# Patient Record
Sex: FEMALE | Race: WHITE | NOT HISPANIC OR LATINO | Employment: OTHER | RURAL
[De-identification: names, ages, dates, MRNs, and addresses within clinical notes are randomized per-mention and may not be internally consistent; named-entity substitution may affect disease eponyms.]

---

## 2022-05-05 ENCOUNTER — HOSPITAL ENCOUNTER (OUTPATIENT)
Dept: RADIOLOGY | Facility: HOSPITAL | Age: 77
Discharge: HOME | End: 2022-05-05
Attending: ORTHOPAEDIC SURGERY
Payer: MEDICARE

## 2022-05-05 DIAGNOSIS — M79.672 PAIN OF LEFT HEEL: ICD-10-CM

## 2022-05-05 PROCEDURE — 73650 X-RAY EXAM OF HEEL: CPT | Mod: TC,LT

## 2022-06-08 ENCOUNTER — OFFICE VISIT (OUTPATIENT)
Dept: DERMATOLOGY | Facility: CLINIC | Age: 77
End: 2022-06-08
Payer: MEDICARE

## 2022-06-08 VITALS — RESPIRATION RATE: 18 BRPM

## 2022-06-08 DIAGNOSIS — L43.9 LICHEN PLANUS: Primary | ICD-10-CM

## 2022-06-08 PROCEDURE — 99203 OFFICE O/P NEW LOW 30 MIN: CPT | Mod: ,,, | Performed by: DERMATOLOGY

## 2022-06-08 PROCEDURE — 99203 PR OFFICE/OUTPT VISIT, NEW, LEVL III, 30-44 MIN: ICD-10-PCS | Mod: ,,, | Performed by: DERMATOLOGY

## 2022-06-08 RX ORDER — CLOBETASOL PROPIONATE 0.5 MG/G
CREAM TOPICAL
Qty: 60 G | Refills: 1 | Status: SHIPPED | OUTPATIENT
Start: 2022-06-08 | End: 2022-07-21 | Stop reason: SDUPTHER

## 2022-06-08 NOTE — PROGRESS NOTES
[facility]   Nereyda Quick MD    Patient Name: Shawn Pickard  Patient YOB: 1945   Date of Service: 6/8/22    CC: Rash    HPI: Shawn Pickard is a 76 y.o. female here today for rash, located on the bilateral arms and legs.  Rash has been present for 2 months.  Previous treatments include MTX,  hydrocortisone cream and benadryl.     History reviewed. No pertinent past medical history.  History reviewed. No pertinent surgical history.  Review of patient's allergies indicates:   Allergen Reactions    Penicillins      Other reaction(s): Rash      Sulfa (sulfonamide antibiotics)      Other reaction(s): KIDNEY PROBLEMS         Current Outpatient Medications:     clobetasoL (TEMOVATE) 0.05 % cream, Apply to rash on body BID, tapering with improvement, Disp: 60 g, Rfl: 1    hydroCHLOROthiazide (HYDRODIURIL) 25 MG tablet, , Disp: , Rfl:     hydrOXYzine HCL (ATARAX) 25 MG tablet, hydroxyzine HCl 25 mg tablet  Take 1 tablet twice a day by oral route as needed for 30 days., Disp: , Rfl:     inFLIXimab (REMICADE) 100 mg injection, Remicade 100 mg intravenous solution  Inject every 2 months by intravenous route., Disp: , Rfl:     levothyroxine (SYNTHROID) 50 MCG tablet, , Disp: , Rfl:     metoprolol succinate (TOPROL-XL) 25 MG 24 hr tablet, metoprolol succinate ER 25 mg tablet,extended release 24 hr, Disp: , Rfl:     mirtazapine (REMERON) 7.5 MG Tab, , Disp: , Rfl:     oxybutynin (DITROPAN-XL) 5 MG TR24, , Disp: , Rfl:     potassium chloride SA (K-DUR,KLOR-CON) 20 MEQ tablet, , Disp: , Rfl:     traMADoL (ULTRAM) 50 mg tablet, , Disp: , Rfl:     ROS: A focused review of systems was obtained and negative.     Exam: A focused skin exam was performed. All areas examined were normal except as mentioned in the assessment and plan below.  General Appearance of the patient is well developed and well nourished.  Orientation: alert and oriented x 3.  Mood and affect: pleasant.    Assessment:   The  encounter diagnosis was Lichen planus.    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  Expectations: Lichen Planus can last for months and is pruritic, but usually responsive to therapy. Lichen planus can be associated with chronic liver disease, viral hepatitis, and anti-malarials, gold and penicillamine.  Contact office if: Lichen Planus worsens, or spread to other parts of the body.    - will start clobetasol but consider systemic if Inadequately controlled with topical steroids alone    Follow up in about 6 weeks (around 7/20/2022) for Lichen Planus .    Nereyda Quick MD

## 2022-06-17 ENCOUNTER — HOSPITAL ENCOUNTER (OUTPATIENT)
Dept: RADIOLOGY | Facility: HOSPITAL | Age: 77
Discharge: HOME | End: 2022-06-17
Attending: ORTHOPAEDIC SURGERY
Payer: MEDICARE

## 2022-06-17 DIAGNOSIS — S92.011A CLOSED DISPLACED FRACTURE OF BODY OF RIGHT CALCANEUS, INITIAL ENCOUNTER: ICD-10-CM

## 2022-06-17 PROCEDURE — 73650 X-RAY EXAM OF HEEL: CPT | Mod: TC,RT

## 2022-07-21 ENCOUNTER — OFFICE VISIT (OUTPATIENT)
Dept: DERMATOLOGY | Facility: CLINIC | Age: 77
End: 2022-07-21
Payer: MEDICARE

## 2022-07-21 DIAGNOSIS — L43.9 LICHEN PLANUS: Primary | ICD-10-CM

## 2022-07-21 DIAGNOSIS — L29.9 PRURITUS: ICD-10-CM

## 2022-07-21 PROCEDURE — 99214 OFFICE O/P EST MOD 30 MIN: CPT | Mod: ,,, | Performed by: DERMATOLOGY

## 2022-07-21 PROCEDURE — 99214 PR OFFICE/OUTPT VISIT, EST, LEVL IV, 30-39 MIN: ICD-10-PCS | Mod: ,,, | Performed by: DERMATOLOGY

## 2022-07-21 RX ORDER — HYDROXYZINE HYDROCHLORIDE 25 MG/1
25 TABLET, FILM COATED ORAL EVERY 6 HOURS PRN
Qty: 180 TABLET | Refills: 1 | Status: SHIPPED | OUTPATIENT
Start: 2022-07-21 | End: 2022-09-19

## 2022-07-21 RX ORDER — CLOBETASOL PROPIONATE 0.5 MG/G
CREAM TOPICAL
Qty: 60 G | Refills: 1 | Status: SHIPPED | OUTPATIENT
Start: 2022-07-21

## 2022-07-21 NOTE — PROGRESS NOTES
[facility]   Nereyda Quick MD    Patient Name: Shawn Pickard  Patient YOB: 1945   Date of Service: 7/21/22    CC: Follow-up Lichen planus    HPI: Shawn Pickard is a 76 y.o. female here today for follow-up of lichen planus, last seen 06/22.  Previous treatments include clobetasol cream.  Overall, the lichen planus is improved.  Treatment plan was followed as directed.    History reviewed. No pertinent past medical history.  History reviewed. No pertinent surgical history.  Review of patient's allergies indicates:   Allergen Reactions    Penicillins      Other reaction(s): Rash      Sulfa (sulfonamide antibiotics)      Other reaction(s): KIDNEY PROBLEMS         Current Outpatient Medications:     clobetasoL (TEMOVATE) 0.05 % cream, Apply to rash on body BID, tapering with improvement, Disp: 60 g, Rfl: 1    hydroCHLOROthiazide (HYDRODIURIL) 25 MG tablet, , Disp: , Rfl:     hydrOXYzine HCL (ATARAX) 25 MG tablet, hydroxyzine HCl 25 mg tablet  Take 1 tablet twice a day by oral route as needed for 30 days., Disp: , Rfl:     hydrOXYzine HCL (ATARAX) 25 MG tablet, Take 1 tablet (25 mg total) by mouth every 6 (six) hours as needed for Itching., Disp: 180 tablet, Rfl: 1    inFLIXimab (REMICADE) 100 mg injection, Remicade 100 mg intravenous solution  Inject every 2 months by intravenous route., Disp: , Rfl:     levothyroxine (SYNTHROID) 50 MCG tablet, , Disp: , Rfl:     metoprolol succinate (TOPROL-XL) 25 MG 24 hr tablet, metoprolol succinate ER 25 mg tablet,extended release 24 hr, Disp: , Rfl:     mirtazapine (REMERON) 7.5 MG Tab, , Disp: , Rfl:     oxybutynin (DITROPAN-XL) 5 MG TR24, , Disp: , Rfl:     potassium chloride SA (K-DUR,KLOR-CON) 20 MEQ tablet, , Disp: , Rfl:     traMADoL (ULTRAM) 50 mg tablet, , Disp: , Rfl:     ROS: A focused review of systems was obtained and negative.     Exam: A focused skin exam was performed. All areas examined were normal except as mentioned in the  assessment and plan below.  General Appearance of the patient is well developed and well nourished.  Orientation: alert and oriented x 3.  Mood and affect: pleasant.    Assessment:   The primary encounter diagnosis was Lichen planus. A diagnosis of Pruritus was also pertinent to this visit.    Plan:   Medications Ordered This Encounter   Medications    clobetasoL (TEMOVATE) 0.05 % cream     Sig: Apply to rash on body BID, tapering with improvement     Dispense:  60 g     Refill:  1    hydrOXYzine HCL (ATARAX) 25 MG tablet     Sig: Take 1 tablet (25 mg total) by mouth every 6 (six) hours as needed for Itching.     Dispense:  180 tablet     Refill:  1        Lichen Planus with significant pruritus   - purple polygonal lichenoid papules  Status: Inadequately controlled      Plan: Counseling  I counseled the patient regarding the following:  Skin care: Recommend topical steroids, ambient sun exposure and antihistamines if itchy.  Expectations: Lichen Planus can last for months and is pruritic, but usually responsive to therapy. Lichen planus can be associated with chronic liver disease, viral hepatitis, and anti-malarials, gold and penicillamine.  Contact office if: Lichen Planus worsens, or spread to other parts of the body.    - continue clobetasol BID scheduled  - discussed her care with her NP Zeinab Childers.  She is ok with adding hydroxyzine in place of benadryl q6 hours PRN itching        Follow up in about 6 weeks (around 9/1/2022) for Lichen Planus.    Nereyda Quick MD

## 2022-08-23 ENCOUNTER — HOSPITAL ENCOUNTER (OUTPATIENT)
Dept: RADIOLOGY | Facility: HOSPITAL | Age: 77
Discharge: HOME | End: 2022-08-23
Attending: ORTHOPAEDIC SURGERY
Payer: MEDICARE

## 2022-08-23 DIAGNOSIS — M25.562 ACUTE PAIN OF LEFT KNEE: ICD-10-CM

## 2022-08-23 PROCEDURE — 73564 X-RAY EXAM KNEE 4 OR MORE: CPT | Mod: TC,LT

## 2022-09-01 ENCOUNTER — OFFICE VISIT (OUTPATIENT)
Dept: DERMATOLOGY | Facility: CLINIC | Age: 77
End: 2022-09-01
Payer: MEDICARE

## 2022-09-01 VITALS — RESPIRATION RATE: 18 BRPM | BODY MASS INDEX: 20.24 KG/M2 | HEIGHT: 62 IN | WEIGHT: 110 LBS

## 2022-09-01 DIAGNOSIS — L43.9 LICHEN PLANUS: Primary | ICD-10-CM

## 2022-09-01 PROCEDURE — 99213 OFFICE O/P EST LOW 20 MIN: CPT | Mod: ,,, | Performed by: DERMATOLOGY

## 2022-09-01 PROCEDURE — 99213 PR OFFICE/OUTPT VISIT, EST, LEVL III, 20-29 MIN: ICD-10-PCS | Mod: ,,, | Performed by: DERMATOLOGY

## 2022-09-01 RX ORDER — NYSTATIN 100000 U/G
CREAM TOPICAL
COMMUNITY
Start: 2022-08-05

## 2022-09-01 RX ORDER — FOLIC ACID 1 MG/1
1 TABLET ORAL
COMMUNITY

## 2022-09-01 RX ORDER — MUPIROCIN 20 MG/G
OINTMENT TOPICAL
COMMUNITY

## 2022-09-01 RX ORDER — ERGOCALCIFEROL 1.25 MG/1
CAPSULE ORAL
COMMUNITY

## 2022-09-01 RX ORDER — CHLORHEXIDINE GLUCONATE ORAL RINSE 1.2 MG/ML
SOLUTION DENTAL
COMMUNITY

## 2022-09-01 RX ORDER — METRONIDAZOLE 250 MG/1
250 TABLET ORAL 3 TIMES DAILY
COMMUNITY
Start: 2022-07-20

## 2022-09-01 RX ORDER — SERTRALINE HYDROCHLORIDE 25 MG/1
25 TABLET, FILM COATED ORAL DAILY
COMMUNITY
Start: 2022-08-29

## 2022-09-01 RX ORDER — CIPROFLOXACIN 500 MG/1
500 TABLET ORAL 2 TIMES DAILY
COMMUNITY
Start: 2022-07-20

## 2022-09-01 RX ORDER — GUAIFENESIN 1200 MG
TABLET, EXTENDED RELEASE 12 HR ORAL DAILY PRN
COMMUNITY

## 2022-09-01 RX ORDER — ONDANSETRON 4 MG/1
TABLET, FILM COATED ORAL
COMMUNITY
Start: 2022-08-02

## 2022-09-01 RX ORDER — DEXAMETHASONE SODIUM PHOSPHATE 4 MG/ML
INJECTION, SOLUTION INTRA-ARTICULAR; INTRALESIONAL; INTRAMUSCULAR; INTRAVENOUS; SOFT TISSUE
COMMUNITY
Start: 2022-05-11

## 2022-09-01 RX ORDER — KETOCONAZOLE 20 MG/ML
SHAMPOO, SUSPENSION TOPICAL
COMMUNITY
Start: 2021-09-04

## 2022-09-01 RX ORDER — QUETIAPINE FUMARATE 25 MG/1
25 TABLET, FILM COATED ORAL NIGHTLY
COMMUNITY
Start: 2022-08-27

## 2022-09-01 RX ORDER — CHOLECALCIFEROL (VITAMIN D3) 125 MCG
CAPSULE ORAL
COMMUNITY

## 2022-09-01 RX ORDER — CHOLESTYRAMINE 4 G/4.8G
POWDER, FOR SUSPENSION ORAL
COMMUNITY

## 2022-09-01 RX ORDER — HYDROCODONE BITARTRATE AND ACETAMINOPHEN 5; 325 MG/1; MG/1
TABLET ORAL
COMMUNITY
Start: 2022-08-22

## 2022-09-01 RX ORDER — DICLOFENAC SODIUM 10 MG/G
GEL TOPICAL
COMMUNITY

## 2022-09-01 RX ORDER — PREDNISONE 10 MG/1
10 TABLET ORAL DAILY
Qty: 21 TABLET | Refills: 0 | Status: SHIPPED | OUTPATIENT
Start: 2022-09-01 | End: 2022-09-22

## 2022-09-01 RX ORDER — FLUCONAZOLE 100 MG/1
TABLET ORAL
COMMUNITY
Start: 2022-08-05

## 2022-09-01 RX ORDER — TRIAMCINOLONE ACETONIDE 1 MG/G
OINTMENT TOPICAL
COMMUNITY

## 2022-09-01 RX ORDER — CLINDAMYCIN HYDROCHLORIDE 300 MG/1
CAPSULE ORAL
COMMUNITY

## 2022-09-01 NOTE — PROGRESS NOTES
[facility]   Nereyda Quick MD    Patient Name: Shawn Pickard  Patient YOB: 1945   Date of Service: 9/1/22    CC: Follow-up Lichen Planus     HPI: Shawn Pickard is a 76 y.o. female here today for follow-up of lichen planus, last seen 7/21/22.  Previous treatments include clobetasol and hydroxyzine.  Overall, the lichen planus is stable.  Treatment plan was followed as directed.    Past Medical History:   Diagnosis Date    Crohn disease     Hypertension     Osteoarthritis      Past Surgical History:   Procedure Laterality Date    JOINT REPLACEMENT      LAPAROSCOPIC LEFT COLON RESECTION      TONSILLECTOMY       Review of patient's allergies indicates:   Allergen Reactions    Penicillins      Other reaction(s): Rash      Sulfa (sulfonamide antibiotics)      Other reaction(s): KIDNEY PROBLEMS         Current Outpatient Medications:     ketoconazole (NIZORAL) 2 % shampoo, ketoconazole 2 % shampoo, Disp: , Rfl:     acetaminophen 325 mg Cap, Take by mouth daily as needed., Disp: , Rfl:     chlorhexidine (PERIDEX) 0.12 % solution, chlorhexidine gluconate 0.12 % mouthwash, Disp: , Rfl:     cholestyramine-aspartame (QUESTRAN LIGHT) 4 gram PwPk, Cholestyramine Light 4 gram oral powder  Take 1 scoop 3 times a day by oral route., Disp: , Rfl:     ciprofloxacin HCl (CIPRO) 500 MG tablet, Take 500 mg by mouth 2 (two) times daily., Disp: , Rfl:     clindamycin (CLEOCIN) 300 MG capsule, clindamycin HCl 300 mg capsule, Disp: , Rfl:     clobetasoL (TEMOVATE) 0.05 % cream, Apply to rash on body BID, tapering with improvement, Disp: 60 g, Rfl: 1    dexamethasone (DECADRON) 4 mg/mL injection, Inject into the muscle., Disp: , Rfl:     diclofenac sodium (VOLTAREN) 1 % Gel, diclofenac 1 % topical gel, Disp: , Rfl:     ergocalciferol (ERGOCALCIFEROL) 50,000 unit Cap, Vitamin D2 1,250 mcg (50,000 unit) capsule  Take 1 capsule every week by oral route., Disp: , Rfl:     fluconazole (DIFLUCAN) 100 MG tablet, , Disp: ,  Rfl:     folic acid (FOLVITE) 1 MG tablet, Take 1 mg by mouth., Disp: , Rfl:     hydroCHLOROthiazide (HYDRODIURIL) 25 MG tablet, , Disp: , Rfl:     HYDROcodone-acetaminophen (NORCO) 5-325 mg per tablet, , Disp: , Rfl:     hydrOXYzine HCL (ATARAX) 25 MG tablet, hydroxyzine HCl 25 mg tablet  Take 1 tablet twice a day by oral route as needed for 30 days., Disp: , Rfl:     hydrOXYzine HCL (ATARAX) 25 MG tablet, Take 1 tablet (25 mg total) by mouth every 6 (six) hours as needed for Itching., Disp: 180 tablet, Rfl: 1    inFLIXimab (REMICADE) 100 mg injection, Remicade 100 mg intravenous solution  Inject every 2 months by intravenous route., Disp: , Rfl:     Lactobac 66-Bifido 4-S.thermo (ADVANCED PROBIOTIC-14) 3 billion cell Cap, Probiotic, Disp: , Rfl:     levothyroxine (SYNTHROID) 50 MCG tablet, , Disp: , Rfl:     metoprolol succinate (TOPROL-XL) 25 MG 24 hr tablet, metoprolol succinate ER 25 mg tablet,extended release 24 hr, Disp: , Rfl:     metroNIDAZOLE (FLAGYL) 250 MG tablet, Take 250 mg by mouth 3 (three) times daily., Disp: , Rfl:     mirtazapine (REMERON) 7.5 MG Tab, , Disp: , Rfl:     mupirocin (BACTROBAN) 2 % ointment, mupirocin 2 % topical ointment, Disp: , Rfl:     nystatin (MYCOSTATIN) cream, , Disp: , Rfl:     ondansetron (ZOFRAN) 4 MG tablet, , Disp: , Rfl:     oxybutynin (DITROPAN-XL) 5 MG TR24, , Disp: , Rfl:     potassium chloride SA (K-DUR,KLOR-CON) 20 MEQ tablet, , Disp: , Rfl:     predniSONE (DELTASONE) 10 MG tablet, Take 1 tablet (10 mg total) by mouth once daily. for 21 days, Disp: 21 tablet, Rfl: 0    QUEtiapine (SEROQUEL) 25 MG Tab, Take 25 mg by mouth every evening., Disp: , Rfl:     sertraline (ZOLOFT) 25 MG tablet, Take 25 mg by mouth once daily., Disp: , Rfl:     traMADoL (ULTRAM) 50 mg tablet, , Disp: , Rfl:     triamcinolone acetonide 0.1% (KENALOG) 0.1 % ointment, triamcinolone acetonide 0.1 % topical ointment, Disp: , Rfl:     ustekinumab (STELARA) 90 mg/mL Syrg syringe, 1 mL., Disp: ,  Rfl:     ROS: A focused review of systems was obtained and negative.     Exam: A focused skin exam was performed. All areas examined were normal except as mentioned in the assessment and plan below.  General Appearance of the patient is well developed and well nourished.  Orientation: alert and oriented x 3.  Mood and affect: pleasant.    Assessment:   The encounter diagnosis was Lichen planus.    Plan:   Medications Ordered This Encounter   Medications    predniSONE (DELTASONE) 10 MG tablet     Sig: Take 1 tablet (10 mg total) by mouth once daily. for 21 days     Dispense:  21 tablet     Refill:  0     Lichen Planus  - purple polygonal lichenoid papules  Status: Inadequately controlled    Plan: Counseling  I counseled the patient regarding the following:  Skin care: Recommend topical steroids, ambient sun exposure and antihistamines if itchy.  Expectations: Lichen Planus can last for months and is pruritic, but usually responsive to therapy. Lichen planus can be associated with chronic liver disease, viral hepatitis, and anti-malarials, gold and penicillamine.  Contact office if: Lichen Planus worsens, or spread to other parts of the body.    - Will start low dose prednisone x3 weeks   - continue clobetasol     Prednisone Counseling: I discussed with the patient the risks of prolonged use of prednisone including but not limited to weight gain, insomnia, osteoporosis, mood changes, diabetes, susceptibility to infection, glaucoma and high blood pressure. In cases where prednisone use is prolonged, patients should be monitored with blood pressure checks, serum glucose levels and an eye exam. Additionally, the patient may need to be placed on GI prophylaxis, PCP prophylaxis, and calcium and vitamin D supplementation and/or a bisphosphonate. The patient verbalized understanding of the proper use and the possible adverse effects of prednisone. All of the patient's questions and concerns were addressed.      Follow up in  about 2 months (around 11/1/2022) for Lichen Planus .    Nereyda Quick MD